# Patient Record
Sex: FEMALE | Race: WHITE | NOT HISPANIC OR LATINO | Employment: OTHER | URBAN - METROPOLITAN AREA
[De-identification: names, ages, dates, MRNs, and addresses within clinical notes are randomized per-mention and may not be internally consistent; named-entity substitution may affect disease eponyms.]

---

## 2019-03-19 ENCOUNTER — OFFICE VISIT (OUTPATIENT)
Dept: GASTROENTEROLOGY | Facility: CLINIC | Age: 79
End: 2019-03-19

## 2019-03-19 VITALS
DIASTOLIC BLOOD PRESSURE: 72 MMHG | BODY MASS INDEX: 28.28 KG/M2 | TEMPERATURE: 97.7 F | HEIGHT: 67 IN | SYSTOLIC BLOOD PRESSURE: 130 MMHG | WEIGHT: 180.2 LBS

## 2019-03-19 DIAGNOSIS — Z12.11 ENCOUNTER FOR SCREENING FOR MALIGNANT NEOPLASM OF COLON: Primary | ICD-10-CM

## 2019-03-19 PROCEDURE — 99213 OFFICE O/P EST LOW 20 MIN: CPT | Performed by: INTERNAL MEDICINE

## 2019-03-19 RX ORDER — SITAGLIPTIN 50 MG/1
50 TABLET, FILM COATED ORAL DAILY
COMMUNITY
Start: 2019-01-24

## 2019-03-19 RX ORDER — PRAVASTATIN SODIUM 10 MG
10 TABLET ORAL NIGHTLY
COMMUNITY
Start: 2019-01-25

## 2019-03-19 NOTE — PROGRESS NOTES
Chief Complaint   Patient presents with   • Heartburn       Tom Hdez is a  79 y.o. female here for a follow up visit for abdominal pain.    HPI     Patient 79-year-old female with history of recurrent diverticulitis last in 2016 with diverticulitis of the jejunum with abscess formation.  Patient underwent small bowel resection now returns with episode in November of recurrent abdominal pain.  Patient found on CT with likely jejunal diverticulitis but was mentioned on the CT there was also colon diverticuli in the same area.  Patient currently with no fever or chills does notice chronic loose stool though.  Patient here for further recommendations.    Past Medical History:   Diagnosis Date   • Arthritis    • COPD (chronic obstructive pulmonary disease) (CMS/HCC)    • Diabetes mellitus (CMS/HCC)    • Diverticulitis of colon    • Fracture     IN LEFT FOOT. HEALED NOW   • History of transfusion     WITH CHILDBIRTH   • Hyperlipidemia    • Hypertension    • WPW (Carol-Parkinson-White syndrome)     CARDIOVERSION. FOLLOWED UP WITH CARDIO ABOUT 10 YRS AGO.         Current Outpatient Medications:   •  albuterol (PROVENTIL HFA;VENTOLIN HFA) 108 (90 BASE) MCG/ACT inhaler, Inhale 2 puffs As Needed for Wheezing., Disp: , Rfl:   •  Ascorbic Acid (VITAMIN C) 500 MG capsule, Take 500 mg by mouth 2 (two) times a day., Disp: , Rfl:   •  ASPIR 81 MG EC tablet, 81 mg. TO STOP 5 DAYS PRIOR TO OR, Disp: , Rfl:   •  calcium citrate-vitamin d (CITRACAL) 200-250 MG-UNIT tablet tablet, Take 1 tablet by mouth 2 (two) times a day., Disp: , Rfl:   •  cholecalciferol (VITAMIN D3) 1000 UNITS tablet, Take 2,000 Units by mouth daily., Disp: , Rfl:   •  fluticasone (FLONASE) 50 MCG/ACT nasal spray, 1 spray into the nostril(s) as directed by provider As Needed., Disp: , Rfl:   •  lisinopril (PRINIVIL,ZESTRIL) 10 MG tablet, Take 10 mg by mouth daily., Disp: , Rfl:   •  Multiple Vitamins-Minerals (MULTIVITAMIN ADULT PO), Take 1 tablet by mouth  daily., Disp: , Rfl:   •  pravastatin (PRAVACHOL) 10 MG tablet, , Disp: , Rfl:   •  saccharomyces boulardii (FLORASTOR) 250 MG capsule, Take 250 mg by mouth 2 (two) times a day., Disp: , Rfl:   •  Biotin 10 MG tablet, Take 10,000 mg by mouth daily., Disp: , Rfl:   •  JANUVIA 50 MG tablet, , Disp: , Rfl:   •  metFORMIN (GLUCOPHAGE) 500 MG tablet, Take 500 mg by mouth 2 (two) times a day with meals., Disp: , Rfl:   •  tiotropium (SPIRIVA) 18 MCG per inhalation capsule, Place 1 capsule into inhaler and inhale daily., Disp: , Rfl:     Allergies   Allergen Reactions   • Asa [Aspirin] GI Intolerance   • Codeine GI Intolerance   • Erythromycin GI Intolerance   • Ibuprofen GI Intolerance   • Penicillins Itching   • Statins Other (See Comments)     LEG PAINS       Social History     Socioeconomic History   • Marital status:      Spouse name: Not on file   • Number of children: Not on file   • Years of education: Not on file   • Highest education level: Not on file   Social Needs   • Financial resource strain: Not on file   • Food insecurity - worry: Not on file   • Food insecurity - inability: Not on file   • Transportation needs - medical: Not on file   • Transportation needs - non-medical: Not on file   Occupational History   • Not on file   Tobacco Use   • Smoking status: Former Smoker     Packs/day: 0.50     Years: 15.00     Pack years: 7.50   • Smokeless tobacco: Never Used   • Tobacco comment: QUIT AT 42 YRS AGO,   Substance and Sexual Activity   • Alcohol use: Yes     Alcohol/week: 1.2 oz     Types: 2 Glasses of wine per week     Comment: social   • Drug use: No   • Sexual activity: Defer   Other Topics Concern   • Not on file   Social History Narrative   • Not on file       Family History   Family history unknown: Yes       Review of Systems   Constitutional: Negative.    Respiratory: Negative.    Cardiovascular: Negative.    Gastrointestinal: Negative.    Musculoskeletal: Negative.    Skin: Negative.     Hematological: Negative.        Vitals:    03/19/19 1349   BP: 130/72   Temp: 97.7 °F (36.5 °C)       Physical Exam   Constitutional: She is oriented to person, place, and time. She appears well-developed and well-nourished.   HENT:   Head: Normocephalic and atraumatic.   Eyes: Pupils are equal, round, and reactive to light. No scleral icterus.   Cardiovascular: Normal rate, regular rhythm and normal heart sounds.   Pulmonary/Chest: Effort normal and breath sounds normal.   Abdominal: Soft. Bowel sounds are normal. She exhibits no distension and no mass. There is no tenderness. No hernia.   Neurological: She is alert and oriented to person, place, and time.   Skin: Skin is warm and dry. No rash noted.   Psychiatric: She has a normal mood and affect. Her behavior is normal.   Vitals reviewed.      No visits with results within 2 Month(s) from this visit.   Latest known visit with results is:   Admission on 03/24/2016, Discharged on 03/29/2016   Component Date Value Ref Range Status   • Glucose 03/24/2016 161* 65 - 99 mg/dL Final   • Case Report 03/24/2016    Final                    Value:Surgical Pathology Report                         Case: [id number]                                  Authorizing Provider:  Gerry Rdz MD        Collected:           03/24/2016 01:03 PM          Ordering Location:     [facility]  Received:            03/24/2016 03:09 PM                                 MAIN OR                                                                      Pathologist:           Bert Pham MD                                                                           Specimen:    Small Intestine, Jejunum, mesenteric abcess                                               • Final Diagnosis 03/24/2016    Final                    Value:This result contains rich text formatting which cannot be displayed here.    • Gross Description 03/24/2016    Final                    Value:This result contains rich text formatting which cannot be displayed here.   • Microscopic Description 03/24/2016    Final                    Value:This result contains rich text formatting which cannot be displayed here.   • Culture 03/24/2016 No anaerobes isolated at 3 days   Final   • Wound Culture 03/24/2016 No growth at 3 days   Final   • Gram Stain 03/24/2016 3+ WBCs seen   Final   • Gram Stain 03/24/2016 No organisms seen   Final   • Glucose, Fasting 03/24/2016 139* 72 - 112 mg/dL Final   • Glucose 03/24/2016 152* 65 - 99 mg/dL Final   • Glucose 03/24/2016 158* 65 - 99 mg/dL Final   • Glucose 03/25/2016 161* 65 - 99 mg/dL Final   • BUN 03/25/2016 11  8 - 23 mg/dL Final   • Creatinine 03/25/2016 0.74  0.57 - 1.00 mg/dL Final   • Sodium 03/25/2016 138  136 - 145 mmol/L Final   • Potassium 03/25/2016 4.2  3.5 - 5.2 mmol/L Final   • Chloride 03/25/2016 100  98 - 107 mmol/L Final   • CO2 03/25/2016 28.1  22.0 - 29.0 mmol/L Final   • Calcium 03/25/2016 7.9* 8.6 - 10.5 mg/dL Final   • eGFR Non African Amer 03/25/2016 76  >60 mL/min/1.73 Final   • BUN/Creatinine Ratio 03/25/2016 14.9  7.0 - 25.0 Final   • Anion Gap 03/25/2016 9.9  mmol/L Final   • WBC 03/25/2016 16.50* 4.50 - 10.70 10*3/mm3 Final   • RBC 03/25/2016 3.46* 3.90 - 5.20 10*6/mm3 Final   • Hemoglobin 03/25/2016 9.9* 11.9 - 15.5 g/dL Final   • Hematocrit 03/25/2016 31.0* 35.6 - 45.5 % Final   • MCV 03/25/2016 89.6  80.5 - 98.2 fL Final   • MCH 03/25/2016 28.6  26.9 - 32.7 pg Final   • MCHC 03/25/2016 31.9* 32.4 - 36.3 g/dL Final   • RDW 03/25/2016 13.8* 11.7 - 13.0 % Final   • RDW-SD 03/25/2016 45.3  37.0 - 54.0 fl Final   • MPV 03/25/2016 10.4  6.0 - 12.0 fL Final   • Platelets 03/25/2016 252  140 - 500 10*3/mm3 Final   • Neutrophil % 03/25/2016 78.0* 42.7 - 76.0 % Final   • Lymphocyte % 03/25/2016 16.3* 19.6 - 45.3 % Final   • Monocyte % 03/25/2016 5.3  5.0 - 12.0 % Final   • Eosinophil %  03/25/2016 0.0* 0.3 - 6.2 % Final   • Basophil % 03/25/2016 0.1  0.0 - 1.5 % Final   • Immature Grans % 03/25/2016 0.3  0.0 - 0.5 % Final   • Neutrophils, Absolute 03/25/2016 12.86* 1.90 - 8.10 10*3/mm3 Final   • Lymphocytes, Absolute 03/25/2016 2.69  0.90 - 4.80 10*3/mm3 Final   • Monocytes, Absolute 03/25/2016 0.88  0.20 - 1.20 10*3/mm3 Final   • Eosinophils, Absolute 03/25/2016 0.00  0.00 - 0.70 10*3/mm3 Final   • Basophils, Absolute 03/25/2016 0.02  0.00 - 0.20 10*3/mm3 Final   • Immature Grans, Absolute 03/25/2016 0.05* 0.00 - 0.03 10*3/mm3 Final   • Glucose 03/25/2016 153* 65 - 99 mg/dL Final   • Glucose 03/25/2016 122* 65 - 99 mg/dL Final   • Glucose 03/25/2016 111* 65 - 99 mg/dL Final   • Glucose 03/26/2016 138* 65 - 99 mg/dL Final   • BUN 03/26/2016 8  8 - 23 mg/dL Final   • Creatinine 03/26/2016 0.64  0.57 - 1.00 mg/dL Final   • Sodium 03/26/2016 135* 136 - 145 mmol/L Final   • Potassium 03/26/2016 4.4  3.5 - 5.2 mmol/L Final   • Chloride 03/26/2016 98  98 - 107 mmol/L Final   • CO2 03/26/2016 24.0  22.0 - 29.0 mmol/L Final   • Calcium 03/26/2016 8.3* 8.6 - 10.5 mg/dL Final   • eGFR Non African Amer 03/26/2016 90  >60 mL/min/1.73 Final   • BUN/Creatinine Ratio 03/26/2016 12.5  7.0 - 25.0 Final   • Anion Gap 03/26/2016 13.0  mmol/L Final   • WBC 03/26/2016 13.67* 4.50 - 10.70 10*3/mm3 Final   • RBC 03/26/2016 3.36* 3.90 - 5.20 10*6/mm3 Final   • Hemoglobin 03/26/2016 9.7* 11.9 - 15.5 g/dL Final   • Hematocrit 03/26/2016 30.6* 35.6 - 45.5 % Final   • MCV 03/26/2016 91.1  80.5 - 98.2 fL Final   • MCH 03/26/2016 28.9  26.9 - 32.7 pg Final   • MCHC 03/26/2016 31.7* 32.4 - 36.3 g/dL Final   • RDW 03/26/2016 14.0* 11.7 - 13.0 % Final   • RDW-SD 03/26/2016 46.4  37.0 - 54.0 fl Final   • MPV 03/26/2016 10.5  6.0 - 12.0 fL Final   • Platelets 03/26/2016 261  140 - 500 10*3/mm3 Final   • Neutrophil % 03/26/2016 73.3  42.7 - 76.0 % Final   • Lymphocyte % 03/26/2016 18.3* 19.6 - 45.3 % Final   • Monocyte % 03/26/2016  6.6  5.0 - 12.0 % Final   • Eosinophil % 03/26/2016 1.0  0.3 - 6.2 % Final   • Basophil % 03/26/2016 0.4  0.0 - 1.5 % Final   • Immature Grans % 03/26/2016 0.4  0.0 - 0.5 % Final   • Neutrophils, Absolute 03/26/2016 10.03* 1.90 - 8.10 10*3/mm3 Final   • Lymphocytes, Absolute 03/26/2016 2.50  0.90 - 4.80 10*3/mm3 Final   • Monocytes, Absolute 03/26/2016 0.90  0.20 - 1.20 10*3/mm3 Final   • Eosinophils, Absolute 03/26/2016 0.14  0.00 - 0.70 10*3/mm3 Final   • Basophils, Absolute 03/26/2016 0.05  0.00 - 0.20 10*3/mm3 Final   • Immature Grans, Absolute 03/26/2016 0.05* 0.00 - 0.03 10*3/mm3 Final   • Glucose 03/26/2016 110* 65 - 99 mg/dL Final   • Glucose 03/26/2016 108* 65 - 99 mg/dL Final   • Glucose 03/26/2016 91  65 - 99 mg/dL Final   • Glucose 03/26/2016 99  65 - 99 mg/dL Final   • Glucose 03/27/2016 96  65 - 99 mg/dL Final   • BUN 03/27/2016 4* 8 - 23 mg/dL Final   • Creatinine 03/27/2016 0.60  0.57 - 1.00 mg/dL Final   • Sodium 03/27/2016 133* 136 - 145 mmol/L Final   • Potassium 03/27/2016 4.1  3.5 - 5.2 mmol/L Final   • Chloride 03/27/2016 98  98 - 107 mmol/L Final   • CO2 03/27/2016 24.4  22.0 - 29.0 mmol/L Final   • Calcium 03/27/2016 8.1* 8.6 - 10.5 mg/dL Final   • eGFR Non African Amer 03/27/2016 97  >60 mL/min/1.73 Final   • BUN/Creatinine Ratio 03/27/2016 6.7* 7.0 - 25.0 Final   • Anion Gap 03/27/2016 10.6  mmol/L Final   • WBC 03/27/2016 7.86  4.50 - 10.70 10*3/mm3 Final   • RBC 03/27/2016 3.13* 3.90 - 5.20 10*6/mm3 Final   • Hemoglobin 03/27/2016 8.9* 11.9 - 15.5 g/dL Final   • Hematocrit 03/27/2016 29.0* 35.6 - 45.5 % Final   • MCV 03/27/2016 92.7  80.5 - 98.2 fL Final   • MCH 03/27/2016 28.4  26.9 - 32.7 pg Final   • MCHC 03/27/2016 30.7* 32.4 - 36.3 g/dL Final   • RDW 03/27/2016 13.7* 11.7 - 13.0 % Final   • RDW-SD 03/27/2016 45.7  37.0 - 54.0 fl Final   • MPV 03/27/2016 10.0  6.0 - 12.0 fL Final   • Platelets 03/27/2016 229  140 - 500 10*3/mm3 Final   • Neutrophil % 03/27/2016 47.5  42.7 - 76.0 %  Final   • Lymphocyte % 03/27/2016 33.5  19.6 - 45.3 % Final   • Monocyte % 03/27/2016 9.4  5.0 - 12.0 % Final   • Eosinophil % 03/27/2016 8.7* 0.3 - 6.2 % Final   • Basophil % 03/27/2016 0.5  0.0 - 1.5 % Final   • Immature Grans % 03/27/2016 0.4  0.0 - 0.5 % Final   • Neutrophils, Absolute 03/27/2016 3.74  1.90 - 8.10 10*3/mm3 Final   • Lymphocytes, Absolute 03/27/2016 2.63  0.90 - 4.80 10*3/mm3 Final   • Monocytes, Absolute 03/27/2016 0.74  0.20 - 1.20 10*3/mm3 Final   • Eosinophils, Absolute 03/27/2016 0.68  0.00 - 0.70 10*3/mm3 Final   • Basophils, Absolute 03/27/2016 0.04  0.00 - 0.20 10*3/mm3 Final   • Immature Grans, Absolute 03/27/2016 0.03  0.00 - 0.03 10*3/mm3 Final   • Glucose 03/27/2016 85  65 - 99 mg/dL Final   • Glucose 03/27/2016 115* 65 - 99 mg/dL Final   • Glucose 03/27/2016 114* 65 - 99 mg/dL Final   • Glucose 03/27/2016 107* 65 - 99 mg/dL Final   • Glucose 03/28/2016 102* 65 - 99 mg/dL Final   • Glucose 03/28/2016 96  65 - 99 mg/dL Final       Dianatres was seen today for heartburn.    Diagnoses and all orders for this visit:    Encounter for screening for malignant neoplasm of colon  -     Case Request; Standing  -     Implement Anesthesia orders day of procedure.; Standing  -     Obtain informed consent; Standing  -     Case Request      Patient 79-year-old female with history of diverticulitis of the small bowel in the past requiring resection secondary to an abscess formation.  Patient does have a history of colonic diverticulosis as well, had another episode of acute pain in the left upper quadrant.  Patient found on CT with uncomplicated diverticulitis what was felt to be of the jejunum again but in the same area with transverse colon diverticuli.  No abscess or free air was seen patient was treated empirically.  Patient reports feeling improved at this time no further pain.  Patient currently on oral probiotics here for further recommendations at this point especially due to the fact that the  diverticulosis of the colon was in the same area would recommend repeat colonoscopy, last done in 2010.  If no other findings with maintain current diet but monitor for symptoms and treat early case recurrent small bowel diverticulitis occurs.  We will try as best as possible to avoid further small bowel resection.

## 2019-04-03 ENCOUNTER — ANESTHESIA (OUTPATIENT)
Dept: GASTROENTEROLOGY | Facility: HOSPITAL | Age: 79
End: 2019-04-03

## 2019-04-03 ENCOUNTER — HOSPITAL ENCOUNTER (OUTPATIENT)
Facility: HOSPITAL | Age: 79
Setting detail: HOSPITAL OUTPATIENT SURGERY
Discharge: HOME | End: 2019-04-03
Attending: INTERNAL MEDICINE | Admitting: INTERNAL MEDICINE

## 2019-04-03 ENCOUNTER — ANESTHESIA EVENT (OUTPATIENT)
Dept: GASTROENTEROLOGY | Facility: HOSPITAL | Age: 79
End: 2019-04-03

## 2019-04-03 VITALS
DIASTOLIC BLOOD PRESSURE: 70 MMHG | HEIGHT: 67 IN | BODY MASS INDEX: 28.09 KG/M2 | TEMPERATURE: 98.1 F | HEART RATE: 66 BPM | WEIGHT: 179 LBS | SYSTOLIC BLOOD PRESSURE: 138 MMHG | OXYGEN SATURATION: 98 % | RESPIRATION RATE: 18 BRPM

## 2019-04-03 DIAGNOSIS — Z12.11 ENCOUNTER FOR SCREENING FOR MALIGNANT NEOPLASM OF COLON: ICD-10-CM

## 2019-04-03 LAB — GLUCOSE BLDC GLUCOMTR-MCNC: 153 MG/DL (ref 70–130)

## 2019-04-03 PROCEDURE — 82962 GLUCOSE BLOOD TEST: CPT

## 2019-04-03 PROCEDURE — 25010000002 PROPOFOL 10 MG/ML EMULSION: Performed by: ANESTHESIOLOGY

## 2019-04-03 PROCEDURE — 88305 TISSUE EXAM BY PATHOLOGIST: CPT | Performed by: INTERNAL MEDICINE

## 2019-04-03 PROCEDURE — 45385 COLONOSCOPY W/LESION REMOVAL: CPT | Performed by: INTERNAL MEDICINE

## 2019-04-03 RX ORDER — SODIUM CHLORIDE 0.9 % (FLUSH) 0.9 %
3 SYRINGE (ML) INJECTION AS NEEDED
Status: DISCONTINUED | OUTPATIENT
Start: 2019-04-03 | End: 2019-04-03 | Stop reason: HOSPADM

## 2019-04-03 RX ORDER — PROPOFOL 10 MG/ML
VIAL (ML) INTRAVENOUS AS NEEDED
Status: DISCONTINUED | OUTPATIENT
Start: 2019-04-03 | End: 2019-04-03 | Stop reason: SURG

## 2019-04-03 RX ORDER — LIDOCAINE HYDROCHLORIDE 20 MG/ML
INJECTION, SOLUTION INFILTRATION; PERINEURAL AS NEEDED
Status: DISCONTINUED | OUTPATIENT
Start: 2019-04-03 | End: 2019-04-03 | Stop reason: SURG

## 2019-04-03 RX ORDER — SODIUM CHLORIDE, SODIUM LACTATE, POTASSIUM CHLORIDE, CALCIUM CHLORIDE 600; 310; 30; 20 MG/100ML; MG/100ML; MG/100ML; MG/100ML
1000 INJECTION, SOLUTION INTRAVENOUS CONTINUOUS
Status: DISCONTINUED | OUTPATIENT
Start: 2019-04-03 | End: 2019-04-03 | Stop reason: HOSPADM

## 2019-04-03 RX ORDER — LIDOCAINE HYDROCHLORIDE 10 MG/ML
0.5 INJECTION, SOLUTION INFILTRATION; PERINEURAL ONCE AS NEEDED
Status: DISCONTINUED | OUTPATIENT
Start: 2019-04-03 | End: 2019-04-03 | Stop reason: HOSPADM

## 2019-04-03 RX ORDER — PROPOFOL 10 MG/ML
VIAL (ML) INTRAVENOUS CONTINUOUS PRN
Status: DISCONTINUED | OUTPATIENT
Start: 2019-04-03 | End: 2019-04-03 | Stop reason: SURG

## 2019-04-03 RX ADMIN — PROPOFOL 100 MCG/KG/MIN: 10 INJECTION, EMULSION INTRAVENOUS at 10:20

## 2019-04-03 RX ADMIN — LIDOCAINE HYDROCHLORIDE 60 MG: 20 INJECTION, SOLUTION INFILTRATION; PERINEURAL at 10:20

## 2019-04-03 RX ADMIN — SODIUM CHLORIDE, POTASSIUM CHLORIDE, SODIUM LACTATE AND CALCIUM CHLORIDE 1000 ML: 600; 310; 30; 20 INJECTION, SOLUTION INTRAVENOUS at 09:51

## 2019-04-03 RX ADMIN — PROPOFOL 100 MG: 10 INJECTION, EMULSION INTRAVENOUS at 10:20

## 2019-04-03 NOTE — ANESTHESIA POSTPROCEDURE EVALUATION
"Patient: Tom Hdez    Procedure Summary     Date:  04/03/19 [facility]    Anesthesia Start:  1018 Anesthesia Stop:  1050    Procedure:  COLONOSCOPY to cecum and t.i. with hot snare polypectomy (N/A ) Diagnosis:       Encounter for screening for malignant neoplasm of colon      Colon polyps      Diverticulosis      Internal hemorrhoids without complication      (Encounter for screening for malignant neoplasm of colon [Z12.11])    Surgeon:  Thien Cole MD Provider:  Damian Estrada MD    Anesthesia Type:  MAC ASA Status:  3          Anesthesia Type: MAC  Last vitals  BP   138/70 (04/03/19 1108)   Temp   36.7 °C (98.1 °F) (04/03/19 1058)   Pulse   66 (04/03/19 1108)   Resp   18 (04/03/19 1108)     SpO2   98 % (04/03/19 1108)     Post Anesthesia Care and Evaluation    Patient location during evaluation: PACU  Patient participation: complete - patient participated  Level of consciousness: awake  Pain score: 0  Pain management: adequate  Airway patency: patent  Anesthetic complications: No anesthetic complications  PONV Status: none  Cardiovascular status: acceptable  Respiratory status: acceptable  Hydration status: acceptable    Comments: /70 (BP Location: Left arm, Patient Position: Lying)   Pulse 66   Temp 36.7 °C (98.1 °F) (Oral)   Resp 18   Ht 170.2 cm (67\")   Wt 81.2 kg (179 lb)   SpO2 98%   BMI 28.04 kg/m²       "

## 2019-04-03 NOTE — ANESTHESIA PREPROCEDURE EVALUATION
Anesthesia Evaluation     Patient summary reviewed and Nursing notes reviewed                Airway   Mallampati: I  TM distance: >3 FB  Neck ROM: full  No difficulty expected  Dental - normal exam     Pulmonary - negative pulmonary ROS and normal exam   Cardiovascular - normal exam    (+) hypertension, hyperlipidemia,       Neuro/Psych- negative ROS  GI/Hepatic/Renal/Endo    (+)   diabetes mellitus,     Musculoskeletal     Abdominal  - normal exam    Bowel sounds: normal.   Substance History - negative use     OB/GYN negative ob/gyn ROS         Other   (+) arthritis                     Anesthesia Plan    ASA 3     MAC     Anesthetic plan, all risks, benefits, and alternatives have been provided, discussed and informed consent has been obtained with: patient.

## 2019-04-03 NOTE — BRIEF OP NOTE
COLONOSCOPY  Progress Note    Tom Hdez  4/3/2019    Pre-op Diagnosis:   Encounter for screening for malignant neoplasm of colon [Z12.11]       Post-Op Diagnosis Codes:     * Encounter for screening for malignant neoplasm of colon [Z12.11]     * Colon polyps [K63.5]     * Diverticulosis [K57.90]     * Internal hemorrhoids without complication [K64.8]    Procedure/CPT® Codes:      Procedure(s):  COLONOSCOPY to cecum and t.i. with hot snare polypectomy    Surgeon(s):  Thien Cole MD    Anesthesia: Monitor Anesthesia Care    Staff:   Endo Technician: Marichuy Anne  Endo Nurse: Sabine Aguilar RN; Gabby Damon RN    Estimated Blood Loss: none    Urine Voided: * No values recorded between 4/3/2019 10:18 AM and 4/3/2019 10:42 AM *    Specimens:                Specimens     ID Source Type Tests Collected By Collected At Frozen?      A Large Intestine, Transverse Colon Polyp · TISSUE PATHOLOGY EXAM   Thien Cole MD 4/3/19 1037      Description: tranverse colon polyp                Drains:      Findings: Colonoscopy to terminal ileum with normal mucosa.  Transverse colon polyp removed completely snare cautery diverticulosis and internal hemorrhoids were noted as well.    Complications: None      Thien Cole MD     Date: 4/3/2019  Time: 10:45 AM

## 2019-04-04 LAB
CYTO UR: NORMAL
LAB AP CASE REPORT: NORMAL
LAB AP DIAGNOSIS COMMENT: NORMAL
PATH REPORT.FINAL DX SPEC: NORMAL
PATH REPORT.GROSS SPEC: NORMAL

## 2019-04-19 ENCOUNTER — TELEPHONE (OUTPATIENT)
Dept: GASTROENTEROLOGY | Facility: CLINIC | Age: 79
End: 2019-04-19

## 2019-04-19 NOTE — TELEPHONE ENCOUNTER
----- Message from Thien Cole MD sent at 4/19/2019  1:12 PM EDT -----  Benign inflammatory polyp.  Repeat colonoscopy 10 years

## 2019-04-19 NOTE — TELEPHONE ENCOUNTER
Call to pt.  Advise per Dr Cole that benign inflammatory polyp.  Repeat c/s in 10 yrs.  Verb understanding.    C/s for 4/3/29 placed in recall.

## 2021-06-22 ENCOUNTER — OFFICE VISIT (OUTPATIENT)
Dept: ORTHOPEDIC SURGERY | Facility: CLINIC | Age: 81
End: 2021-06-22

## 2021-06-22 VITALS — WEIGHT: 179 LBS | TEMPERATURE: 96.9 F | BODY MASS INDEX: 28.09 KG/M2 | HEIGHT: 67 IN

## 2021-06-22 DIAGNOSIS — R52 PAIN: Primary | ICD-10-CM

## 2021-06-22 DIAGNOSIS — M51.36 LUMBAR DEGENERATIVE DISC DISEASE: ICD-10-CM

## 2021-06-22 DIAGNOSIS — M46.1 SACROILIITIS (HCC): ICD-10-CM

## 2021-06-22 PROCEDURE — 99203 OFFICE O/P NEW LOW 30 MIN: CPT | Performed by: ORTHOPAEDIC SURGERY

## 2021-06-22 PROCEDURE — 73501 X-RAY EXAM HIP UNI 1 VIEW: CPT | Performed by: ORTHOPAEDIC SURGERY

## 2021-06-22 RX ORDER — MELOXICAM 15 MG/1
15 TABLET ORAL DAILY
Qty: 30 TABLET | Refills: 0 | Status: SHIPPED | OUTPATIENT
Start: 2021-06-22

## 2021-06-22 RX ORDER — LISINOPRIL 20 MG/1
TABLET ORAL
COMMUNITY
Start: 2021-05-17

## 2021-06-22 NOTE — PROGRESS NOTES
Patient: Tom Hdez  YOB: 1940 81 y.o. female  Medical Record Number: [id number]    Chief Complaints:   Chief Complaint   Patient presents with   • Right Hip - Establish Care, Pain       History of Present Illness:Tom Hdez is a 81 y.o. female who presents with complaints of right hip pain that originated 3 months ago.  Patient denies any known injury to her hip.  Patient states the pain is located just below her right butt cheek.  She describes the pain as a ache and rates it as a 3 out of 10 when taking Tylenol.  She states that she has this pain daily and is exacerbated with activities.  Patient is here today for further evaluation.    Allergies:   Allergies   Allergen Reactions   • Asa [Aspirin] GI Intolerance   • Codeine GI Intolerance   • Erythromycin GI Intolerance   • Ibuprofen GI Intolerance   • Penicillins Itching   • Statins Other (See Comments)     LEG PAINS. Pt reports intolerance. Not allergy       Medications:   Current Outpatient Medications   Medication Sig Dispense Refill   • Ascorbic Acid (VITAMIN C) 500 MG capsule Take 500 mg by mouth 2 (two) times a day.     • ASPIR 81 MG EC tablet Take 81 mg by mouth Daily. PT HOLDING FOR SURGERY     • calcium citrate-vitamin d (CITRACAL) 200-250 MG-UNIT tablet tablet Take 1 tablet by mouth Daily.     • cholecalciferol (VITAMIN D3) 1000 UNITS tablet Take 2,000 Units by mouth daily.     • JANUVIA 50 MG tablet Take 50 mg by mouth Daily.     • lisinopril (PRINIVIL,ZESTRIL) 10 MG tablet Take 10 mg by mouth daily.     • lisinopril (PRINIVIL,ZESTRIL) 20 MG tablet      • Multiple Vitamins-Minerals (MULTIVITAMIN ADULT PO) Take 1 tablet by mouth daily.     • pravastatin (PRAVACHOL) 10 MG tablet Take 10 mg by mouth Every Night.     • sertraline (ZOLOFT) 50 MG tablet      • albuterol (PROVENTIL HFA;VENTOLIN HFA) 108 (90 BASE) MCG/ACT inhaler Inhale 2 puffs As Needed for Wheezing.     • fluticasone (FLONASE) 50 MCG/ACT nasal spray 1 spray into  "the nostril(s) as directed by provider As Needed.       No current facility-administered medications for this visit.         The following portions of the patient's history were reviewed and updated as appropriate: allergies, current medications, past family history, past medical history, past social history, past surgical history and problem list.    Review of Systems:   A 14 point review of systems was performed. All systems negative except pertinent positives/negative listed in HPI above    Physical Exam:   Vitals:    06/22/21 1408   Temp: 96.9 °F (36.1 °C)   Weight: 81.2 kg (179 lb)   Height: 170.2 cm (67\")       General: A and O x 3, ASA, NAD    SCLERA:    Normal    DENTITION:   Normal     Hip:  right    LEG ALIGNMENT:     Neutral   ,    equal leg lengths    GAIT:     Nonantalgic    SKIN:     No abnormality    RANGE OF MOTION:      Full without joint irritability    STRENGTH:     5 / 5    hip flexion and abduction    DISTAL PULSES:    Paplable    DISTAL SENSATION :   Intact    LYMPHATICS:     No   lymphadenopathy    OTHER:          - Negative Stinchfeld test      - Negative log roll      - No Tenderness to palpation trochanteric bursa      - Neg FADIR      - Neg DESTIN      - No SI tenderness       Radiology:  Xrays 2views (AP bilateral hips, and lateral of the hip) ordered and reviewed for evaluation of hip pain  demonstrating  minimal arthritic findings.  No previous x-rays were available for comparison.    Assessment/Plan: Lumbar DDD/right sacroiliitis    Treatment option as well as imaging results were discussed in detail with the patient.  Based off patient's symptoms and examination she has right sacroiliitis and does have some lumbar DDD on her x-rays.  We will give her a prescription for meloxicam as well as physical therapy.  Should she not have any improvements in the month we will order an MRI of her lumbar spine for further evaluation.        Brayden Vera, APRN  6/22/2021     This patient was seen in " conjunction today with Dr. Juancarlos Buckley.  Dr. Buckley agrees with the above-stated assessment and plan.

## 2024-11-27 ENCOUNTER — ANESTHESIA EVENT (OUTPATIENT)
Age: 84
End: 2024-11-27
Payer: MEDICARE

## 2024-11-27 NOTE — PAT
[facility] AA review. Dr. Torres  reviewed chart, okay to proceed at ASC for scheduled surgery.

## 2024-12-02 RX ORDER — EZETIMIBE 10 MG/1
10 TABLET ORAL DAILY
COMMUNITY
Start: 2024-11-22

## 2024-12-02 RX ORDER — ACETAMINOPHEN 500 MG
500 TABLET ORAL
COMMUNITY

## 2024-12-02 NOTE — PAT
PAT call complete. Education provided to the patient on the following:    - Nothing to eat after midnight the night before your procedure, water and black coffee okay up to 2 hours before arrival time. 0400  - If diabetic and procedure is after noon: No food 8 hours prior to arrival time, and only then only clear liquids 2 hours before arrival time.   - You will need to have someone drive you home after your procedure and remain with you for 24 hours after. The  will need to remain on site during your visit.  - Please remove all jewelry, including body piercing's, and leave any valuables at home. Only bring your drivers license and insurance card on day of procedure.  - Do not wear contact lenses; wear glasses and bring your case.  - Wash with antibacterial soap (such as Dial) the night before and morning of procedure.  - Be prepared to provide your last dose of all home medications.  - Coffee and vending available on the 1st and 5th floors; no cafeteria on site.  - You will need to arrive at 0600 on 12/3 at [facility] located at [address]. We are located on the 5th floor.  -Please be aware that arrival times may be subject to change up until the day of surgery.   - Feel free to contact us at: [phone] with any additional questions/concerns.       Patient unsure about some medications. States she is holding aspirin and vitamins but unsure if she takes mobic since her  handles her medications. Patient to hold januvia and lisinopril DOS.

## 2024-12-02 NOTE — DISCHARGE INSTRUCTIONS
POST OPERATIVE INSTRUCTIONS  EYELID SURGERY        Most procedures are performed with local anesthesia with intravenous sedation. While post-operative nausea is rare with this type of anesthesia, it is wise to start your diet by drinking clear liquids after surgery (e.g., 7-Up, Gatorade, ginger ale, etc.).  If clear liquids are tolerated well without nausea or vomiting, you may advance towards a regular diet.  Avoid “fatty foods” (eg, milk, pizza, hamburgers, etc.) on the day of surgery or as long as nausea persists.      Many eyelid procedures only require Extra Strength Tylenol for postoperative pain control.  For those patients with more extensive eyelid reconstruction, a prescription for a pain reliever is often given.  Patients may choose to take the prescribed pain reliever OR Extra Strength Tylenol, BUT NOT both together.  Unless specifically instructed, aspirin products such as Jigna, Bufferin, Anacin, Excedrin, etc., should be avoided for at least one week after surgery.  This also includes Advil, Nuprin, Motrin and Ibuprofen.  Also wait one week to restart vitamins, fish oils, or herbal medications. Any other medications (except blood thinners), which you were taking prior to surgery, should be continued on their regular schedule. If you are on blood thinners, we will call you the day after your surgery to discuss when to restart.     Whenever lying down or sleeping, keep your head elevated on 2 or 3 pillows such that your head is always elevated above the level of your chest.      Apply cold compress to surgical site for 15 min every 1 hour while awake for first 3 days following surgery. Can then decrease frequency to 4-5x daily until followup. A folded washcloth soaked in ice-cold water and wrung out works well for this.  A bag of frozen peas/corn also works well as it is lightweight but molds to the eye.  Do not apply ice directly to the skin. Can also alternate between cold compresses and warm  compresses after the first 3 days.    A small tube of eye ointment should be provided after surgery.  This is to be gently applied to the stitches and/or eye three times daily.  If the eyes feel irritated, the ointment is safe to use in the eye for lubrication.  This will likely result in blurred vision but will go away once the ointment is stopped.    BLUE STITCHES ON FOREHEAD:   No ice; only ointment 3 times/day. Stitches will be removed at your post op appt next week.     If your surgery was done on an outpatient basis, you should receive a phone call the day after surgery to check on your progress and to arrange for a post-operative clinic appointment.  The first post-operative visit will be 1-2 weeks after surgery to check the incisions and remove sutures if necessary.  This appointment may be with Dr. Peña, who is Dr. Mcdermott/Efren's surgical fellow. A second post-operative visit six to eight weeks after surgery will assess the final surgical result.    The following problems should be reported to the office as soon as possible:  Continuous, brisk bleeding.  Please note that some oozing or drainage is common following a surgical procedure.  Do not try to clean dried blood from the surgical site.   This will likely only create more bleeding.  Temperature (fever) over 101?F.  Excessive pain at surgical site not relieved by the pain medication, especially if associated with protrusion of the eyeball.  Sudden loss of vision.  Please note that some blurriness is expected after surgery due to the ointment used around the eyes.  All patients should be able to check their vision even with eyelid swelling.      If a problem should arise or you have a question, someone from our team can be reached at any time of the day or week by calling [phone].  I hope that your surgery experience with us is a positive one.  Please contact my office with any questions.  Sincerely,       MD Randolph Wilkins,  MD

## 2024-12-03 ENCOUNTER — ANESTHESIA (OUTPATIENT)
Age: 84
End: 2024-12-03
Payer: MEDICARE

## 2024-12-03 ENCOUNTER — HOSPITAL ENCOUNTER (OUTPATIENT)
Age: 84
Setting detail: HOSPITAL OUTPATIENT SURGERY
Discharge: HOME | End: 2024-12-03
Attending: OPHTHALMOLOGY | Admitting: OPHTHALMOLOGY
Payer: MEDICARE

## 2024-12-03 VITALS
DIASTOLIC BLOOD PRESSURE: 67 MMHG | HEIGHT: 67 IN | TEMPERATURE: 97.6 F | WEIGHT: 165.6 LBS | SYSTOLIC BLOOD PRESSURE: 139 MMHG | HEART RATE: 83 BPM | RESPIRATION RATE: 14 BRPM | OXYGEN SATURATION: 100 % | BODY MASS INDEX: 25.99 KG/M2

## 2024-12-03 DIAGNOSIS — L90.5 SCAR OF FOREHEAD: ICD-10-CM

## 2024-12-03 LAB
GLUCOSE BLDC GLUCOMTR-MCNC: 142 MG/DL (ref 70–130)
GLUCOSE BLDC GLUCOMTR-MCNC: 155 MG/DL (ref 70–130)

## 2024-12-03 PROCEDURE — 88305 TISSUE EXAM BY PATHOLOGIST: CPT | Performed by: OPHTHALMOLOGY

## 2024-12-03 PROCEDURE — 25010000002 ONDANSETRON PER 1 MG: Performed by: ANESTHESIOLOGY

## 2024-12-03 PROCEDURE — 25010000002 FENTANYL CITRATE (PF) 50 MCG/ML SOLUTION: Performed by: ANESTHESIOLOGY

## 2024-12-03 PROCEDURE — 25010000002 PROPOFOL 10 MG/ML EMULSION: Performed by: ANESTHESIOLOGY

## 2024-12-03 PROCEDURE — 25010000002 CEFAZOLIN PER 500 MG: Performed by: OPHTHALMOLOGY

## 2024-12-03 PROCEDURE — 67900 REPAIR BROW DEFECT: CPT | Performed by: OPHTHALMOLOGY

## 2024-12-03 PROCEDURE — 14040 TIS TRNFR F/C/C/M/N/A/G/H/F: CPT | Performed by: OPHTHALMOLOGY

## 2024-12-03 PROCEDURE — 25010000002 BUPIVACAINE (PF) 0.5 % SOLUTION 10 ML VIAL: Performed by: OPHTHALMOLOGY

## 2024-12-03 PROCEDURE — 25010000002 LIDOCAINE 2% SOLUTION: Performed by: ANESTHESIOLOGY

## 2024-12-03 PROCEDURE — 25010000002 LIDOCAINE-EPINEPHRINE (PF) 2 %-1:200000 SOLUTION 20 ML VIAL: Performed by: OPHTHALMOLOGY

## 2024-12-03 PROCEDURE — 15823 BLEPHARP UPR EYELID XCSV SKN: CPT | Performed by: OPHTHALMOLOGY

## 2024-12-03 PROCEDURE — 25010000002 PHENYLEPHRINE 10 MG/ML SOLUTION: Performed by: ANESTHESIOLOGY

## 2024-12-03 PROCEDURE — 25010000002 DEXAMETHASONE PER 1 MG: Performed by: ANESTHESIOLOGY

## 2024-12-03 PROCEDURE — 25010000002 KETOROLAC TROMETHAMINE PER 15 MG: Performed by: ANESTHESIOLOGY

## 2024-12-03 PROCEDURE — 25810000003 LACTATED RINGERS PER 1000 ML: Performed by: ANESTHESIOLOGY

## 2024-12-03 RX ORDER — SODIUM CHLORIDE 0.9 % (FLUSH) 0.9 %
3 SYRINGE (ML) INJECTION EVERY 12 HOURS SCHEDULED
Status: DISCONTINUED | OUTPATIENT
Start: 2024-12-03 | End: 2024-12-03 | Stop reason: HOSPADM

## 2024-12-03 RX ORDER — PHENYLEPHRINE HYDROCHLORIDE 10 MG/ML
INJECTION INTRAVENOUS AS NEEDED
Status: DISCONTINUED | OUTPATIENT
Start: 2024-12-03 | End: 2024-12-03 | Stop reason: SURG

## 2024-12-03 RX ORDER — ONDANSETRON 2 MG/ML
4 INJECTION INTRAMUSCULAR; INTRAVENOUS ONCE AS NEEDED
Status: DISCONTINUED | OUTPATIENT
Start: 2024-12-03 | End: 2024-12-03 | Stop reason: HOSPADM

## 2024-12-03 RX ORDER — SODIUM CHLORIDE, SODIUM LACTATE, POTASSIUM CHLORIDE, CALCIUM CHLORIDE 600; 310; 30; 20 MG/100ML; MG/100ML; MG/100ML; MG/100ML
9 INJECTION, SOLUTION INTRAVENOUS CONTINUOUS
Status: DISCONTINUED | OUTPATIENT
Start: 2024-12-03 | End: 2024-12-03 | Stop reason: HOSPADM

## 2024-12-03 RX ORDER — HYDROCODONE BITARTRATE AND ACETAMINOPHEN 7.5; 325 MG/1; MG/1
1 TABLET ORAL EVERY 4 HOURS PRN
Status: DISCONTINUED | OUTPATIENT
Start: 2024-12-03 | End: 2024-12-03 | Stop reason: HOSPADM

## 2024-12-03 RX ORDER — KETOROLAC TROMETHAMINE 30 MG/ML
INJECTION, SOLUTION INTRAMUSCULAR; INTRAVENOUS AS NEEDED
Status: DISCONTINUED | OUTPATIENT
Start: 2024-12-03 | End: 2024-12-03 | Stop reason: SURG

## 2024-12-03 RX ORDER — HYDRALAZINE HYDROCHLORIDE 20 MG/ML
5 INJECTION INTRAMUSCULAR; INTRAVENOUS
Status: DISCONTINUED | OUTPATIENT
Start: 2024-12-03 | End: 2024-12-03 | Stop reason: HOSPADM

## 2024-12-03 RX ORDER — HYDROCODONE BITARTRATE AND ACETAMINOPHEN 5; 325 MG/1; MG/1
1 TABLET ORAL EVERY 6 HOURS PRN
Qty: 15 TABLET | Refills: 0 | Status: SHIPPED | OUTPATIENT
Start: 2024-12-03 | End: 2024-12-06

## 2024-12-03 RX ORDER — HYDROCODONE BITARTRATE AND ACETAMINOPHEN 5; 325 MG/1; MG/1
1 TABLET ORAL ONCE AS NEEDED
Status: DISCONTINUED | OUTPATIENT
Start: 2024-12-03 | End: 2024-12-03 | Stop reason: HOSPADM

## 2024-12-03 RX ORDER — FLUMAZENIL 0.1 MG/ML
0.2 INJECTION INTRAVENOUS AS NEEDED
Status: DISCONTINUED | OUTPATIENT
Start: 2024-12-03 | End: 2024-12-03 | Stop reason: HOSPADM

## 2024-12-03 RX ORDER — DIPHENHYDRAMINE HYDROCHLORIDE 50 MG/ML
12.5 INJECTION INTRAMUSCULAR; INTRAVENOUS
Status: DISCONTINUED | OUTPATIENT
Start: 2024-12-03 | End: 2024-12-03 | Stop reason: HOSPADM

## 2024-12-03 RX ORDER — ONDANSETRON 2 MG/ML
INJECTION INTRAMUSCULAR; INTRAVENOUS AS NEEDED
Status: DISCONTINUED | OUTPATIENT
Start: 2024-12-03 | End: 2024-12-03 | Stop reason: SURG

## 2024-12-03 RX ORDER — HYDROMORPHONE HYDROCHLORIDE 1 MG/ML
0.25 INJECTION, SOLUTION INTRAMUSCULAR; INTRAVENOUS; SUBCUTANEOUS
Status: DISCONTINUED | OUTPATIENT
Start: 2024-12-03 | End: 2024-12-03 | Stop reason: HOSPADM

## 2024-12-03 RX ORDER — BACITRACIN 500 [USP'U]/G
OINTMENT OPHTHALMIC 3 TIMES DAILY
Status: SHIPPED | OUTPATIENT
Start: 2024-12-03

## 2024-12-03 RX ORDER — MIDAZOLAM HYDROCHLORIDE 1 MG/ML
0.5 INJECTION, SOLUTION INTRAMUSCULAR; INTRAVENOUS
Status: DISCONTINUED | OUTPATIENT
Start: 2024-12-03 | End: 2024-12-03 | Stop reason: HOSPADM

## 2024-12-03 RX ORDER — DROPERIDOL 2.5 MG/ML
0.62 INJECTION, SOLUTION INTRAMUSCULAR; INTRAVENOUS
Status: DISCONTINUED | OUTPATIENT
Start: 2024-12-03 | End: 2024-12-03 | Stop reason: HOSPADM

## 2024-12-03 RX ORDER — FAMOTIDINE 10 MG/ML
20 INJECTION, SOLUTION INTRAVENOUS ONCE
Status: COMPLETED | OUTPATIENT
Start: 2024-12-03 | End: 2024-12-03

## 2024-12-03 RX ORDER — PROMETHAZINE HYDROCHLORIDE 12.5 MG/1
25 TABLET ORAL ONCE AS NEEDED
Status: DISCONTINUED | OUTPATIENT
Start: 2024-12-03 | End: 2024-12-03 | Stop reason: HOSPADM

## 2024-12-03 RX ORDER — FENTANYL CITRATE 50 UG/ML
25 INJECTION, SOLUTION INTRAMUSCULAR; INTRAVENOUS
Status: DISCONTINUED | OUTPATIENT
Start: 2024-12-03 | End: 2024-12-03 | Stop reason: HOSPADM

## 2024-12-03 RX ORDER — SODIUM CHLORIDE 0.9 % (FLUSH) 0.9 %
3-10 SYRINGE (ML) INJECTION AS NEEDED
Status: DISCONTINUED | OUTPATIENT
Start: 2024-12-03 | End: 2024-12-03 | Stop reason: HOSPADM

## 2024-12-03 RX ORDER — LABETALOL HYDROCHLORIDE 5 MG/ML
5 INJECTION, SOLUTION INTRAVENOUS
Status: DISCONTINUED | OUTPATIENT
Start: 2024-12-03 | End: 2024-12-03 | Stop reason: HOSPADM

## 2024-12-03 RX ORDER — EPHEDRINE SULFATE 50 MG/ML
INJECTION, SOLUTION INTRAVENOUS AS NEEDED
Status: DISCONTINUED | OUTPATIENT
Start: 2024-12-03 | End: 2024-12-03 | Stop reason: SURG

## 2024-12-03 RX ORDER — LIDOCAINE HYDROCHLORIDE 20 MG/ML
INJECTION, SOLUTION INFILTRATION; PERINEURAL AS NEEDED
Status: DISCONTINUED | OUTPATIENT
Start: 2024-12-03 | End: 2024-12-03 | Stop reason: SURG

## 2024-12-03 RX ORDER — PROMETHAZINE HYDROCHLORIDE 25 MG/1
25 SUPPOSITORY RECTAL ONCE AS NEEDED
Status: DISCONTINUED | OUTPATIENT
Start: 2024-12-03 | End: 2024-12-03 | Stop reason: HOSPADM

## 2024-12-03 RX ORDER — DEXAMETHASONE SODIUM PHOSPHATE 4 MG/ML
INJECTION, SOLUTION INTRA-ARTICULAR; INTRALESIONAL; INTRAMUSCULAR; INTRAVENOUS; SOFT TISSUE AS NEEDED
Status: DISCONTINUED | OUTPATIENT
Start: 2024-12-03 | End: 2024-12-03 | Stop reason: SURG

## 2024-12-03 RX ORDER — FENTANYL CITRATE 50 UG/ML
50 INJECTION, SOLUTION INTRAMUSCULAR; INTRAVENOUS ONCE AS NEEDED
Status: COMPLETED | OUTPATIENT
Start: 2024-12-03 | End: 2024-12-03

## 2024-12-03 RX ORDER — NALOXONE HCL 0.4 MG/ML
0.2 VIAL (ML) INJECTION AS NEEDED
Status: DISCONTINUED | OUTPATIENT
Start: 2024-12-03 | End: 2024-12-03 | Stop reason: HOSPADM

## 2024-12-03 RX ORDER — PROPOFOL 10 MG/ML
VIAL (ML) INTRAVENOUS AS NEEDED
Status: DISCONTINUED | OUTPATIENT
Start: 2024-12-03 | End: 2024-12-03 | Stop reason: SURG

## 2024-12-03 RX ADMIN — PROPOFOL 150 MG: 10 INJECTION, EMULSION INTRAVENOUS at 07:09

## 2024-12-03 RX ADMIN — EPHEDRINE SULFATE 10 MG: 50 INJECTION INTRAVENOUS at 07:50

## 2024-12-03 RX ADMIN — SODIUM CHLORIDE, POTASSIUM CHLORIDE, SODIUM LACTATE AND CALCIUM CHLORIDE 9 ML/HR: 600; 310; 30; 20 INJECTION, SOLUTION INTRAVENOUS at 06:37

## 2024-12-03 RX ADMIN — FENTANYL CITRATE 50 MCG: 50 INJECTION, SOLUTION INTRAMUSCULAR; INTRAVENOUS at 08:06

## 2024-12-03 RX ADMIN — SODIUM CHLORIDE 2000 MG: 900 INJECTION INTRAVENOUS at 06:43

## 2024-12-03 RX ADMIN — PHENYLEPHRINE HYDROCHLORIDE 100 MCG: 10 INJECTION INTRAVENOUS at 07:49

## 2024-12-03 RX ADMIN — PROPOFOL 50 MG: 10 INJECTION, EMULSION INTRAVENOUS at 07:17

## 2024-12-03 RX ADMIN — DEXAMETHASONE SODIUM PHOSPHATE 8 MG: 4 INJECTION, SOLUTION INTRA-ARTICULAR; INTRALESIONAL; INTRAMUSCULAR; INTRAVENOUS; SOFT TISSUE at 08:18

## 2024-12-03 RX ADMIN — EPHEDRINE SULFATE 10 MG: 50 INJECTION INTRAVENOUS at 07:46

## 2024-12-03 RX ADMIN — ONDANSETRON 4 MG: 2 INJECTION INTRAMUSCULAR; INTRAVENOUS at 08:18

## 2024-12-03 RX ADMIN — FAMOTIDINE 20 MG: 10 INJECTION, SOLUTION INTRAVENOUS at 06:41

## 2024-12-03 RX ADMIN — EPHEDRINE SULFATE 10 MG: 50 INJECTION INTRAVENOUS at 07:41

## 2024-12-03 RX ADMIN — LIDOCAINE HYDROCHLORIDE 60 MG: 20 INJECTION, SOLUTION INFILTRATION; PERINEURAL at 07:09

## 2024-12-03 RX ADMIN — KETOROLAC TROMETHAMINE 30 MG: 30 INJECTION, SOLUTION INTRAMUSCULAR at 08:18

## 2024-12-03 NOTE — ANESTHESIA PROCEDURE NOTES
Airway  Date/Time: 12/3/2024 7:12 AM  Airway not difficult    General Information and Staff    Patient location during procedure: OR  Anesthesiologist: Reinier Brantley MD    Indications and Patient Condition  Indications for airway management: airway protection    Preoxygenated: yes  MILS not maintained throughout  Mask difficulty assessment: 0 - not attempted    Final Airway Details  Final airway type: supraglottic airway      Successful airway: bronch  Size 4     Number of attempts at approach: 1  Assessment: lips, teeth, and gum same as pre-op and atraumatic intubation

## 2024-12-03 NOTE — ANESTHESIA POSTPROCEDURE EVALUATION
Patient: Tom Hdez    Procedure Summary       Date: 12/03/24 Room / Location: [facility] OR 04 / [facility] MAIN OR    Anesthesia Start: 0700 Anesthesia Stop: 0859    Procedures:       BILATERAL UPPER EYELID BLEPHAROPLASTY FOREHEAD ROTATIONAL FLAP (Bilateral: Eye)      BILATERAL TEMPORAL DIRECT BROWLIFT (Bilateral: Face) Diagnosis:     Surgeons: Randolph Schwartz MD Provider: Reinier Brantley MD    Anesthesia Type: general ASA Status: 3            Anesthesia Type: general    Vitals  Vitals Value Taken Time   /82 12/03/24 0945   Temp 36.4 °C (97.6 °F) 12/03/24 0930   Pulse 85 12/03/24 0957   Resp 12 12/03/24 0945   SpO2 96 % 12/03/24 0957   Vitals shown include unfiled device data.        Post Anesthesia Care and Evaluation    Patient location during evaluation: PACU  Patient participation: complete - patient participated  Level of consciousness: awake  Pain management: adequate    Airway patency: patent  Anesthetic complications: No anesthetic complications  PONV Status: none  Cardiovascular status: acceptable  Respiratory status: acceptable  Hydration status: acceptable

## 2024-12-03 NOTE — OP NOTE
OPERATIVE NOTE    Patient Identification:  Name: Tom Hdez  Age: 84 y.o.  Sex: female  :  1940  MRN: [id number]                                               Preoperative diagnosis: Bilateral brow ptosis, bilateral upper eyelid dermatochalasis, forehead lesion with hypertrophic scar  Postoperative diagnosis: same  Procedure: 1) Bilateral temporal direct brow lift                     2) Bilateral upper eyelid blepharoplasty                     3) Forehead Rotational flap <10sqcm  Surgeon: Dr. Schwartz who was present and scrubbed throughout all critical portions of the operation  Assistants: none  Anesthesia: General  EBL: less than 50 mL.  Specimens:    Order Name Source Comment Collection Info Order Time   TISSUE PATHOLOGY EXAM Forehead  Collected By: Randolph Schwartz MD 12/3/2024  7:30 AM     Release to patient   Routine Release            Description of the procedure: The patient was taken to the operating room and placed on the table in the supine position, where anesthesia was induced. 2% lidocaine with epinephrine and 0.5% marcaine in a 1:1 fashion was injected over the surgical site, and the patient was prepped and draped in the usual manner for orbitofacial surgery.     Corneal protectors were placed in both eyes.     The corneal protectors were removed and antibiotic ophthalmic ointment was placed over the surgical site.     Description of the procedure:     The patient was taken to the operating room and placed on the table in the supine position, where anesthesia was induced. 2% lidocaine with epinephrine and 0.5% marcaine in a 1:1 fashion was injected over the surgical site, and the patient was prepped and draped in the usual manner for orbitofacial surgery.     Corneal protectors were placed in both eyes.     A 15 Bard-Jordi blade incision was made above the right eyebrow line, across the temporal horizontal extent of the brow. A second incision line was drawn 6 mm above the eyebrow  line, and the intervening tissue was excised with a Bard Daniel blade and Carrie scissors. Sharp dissection was carried down to the frontalis muscle. The eyebrow was undermined inferiorly and was mobilized superiorly. The brow was fixated superiorly to the frontalis muscle with 3-0 vicryl sutures deeply. The incision was then closed with 5-0 monocryl sutures subcutaneously and 5-0 Prolene sutures superficially.     The exact same procedure was performed on the contralateral brow.    We then performed the upper eyelid blepharoplasty  A 15 Bard-Daniel blade incision was made 8 mm from the eyelash margin, across the entire horizontal extent of the right upper eyelid. A second incision was made 12 mm inferior to the junction of the brow and eyelid, and a pinch test was used to ensure the amount of skin excision was appropriate. The intervening tissue was excised with a 15 Bard-Daniel blade and Carrie scissors. Excessive orbicularis tissue was removed. The orbital septum was opened horizontally, and excessive fatty tissue was also removed. Bleeding was controlled with electrocauterization. The skin was then closed with 5-0 fast absorbing suture in an interrupted and running fashion, with care to incorporate the prestarsal orbicularis over the pupil, nasal and temporal limbi respectively. The lid position and contour were examined and found to be satisfactory.     The exact same procedure was preformed over the contralateral upper lid.     Lastly we moved to the forehead rotational flap    There was a large central forehead scar with indentation and erythema with surrounding nodular appearance.  A 15 bard daniel blade was used to perform a rectangular scar excision that was size 2x2cm in size.  There was granulomatous scar tissue with black foreign bodies in the subcutaneous tissue that was removed and sent for pathology in formalin.      We then created a standard H-shaped rotational flap utilizing the deep furrows  of the forehead.  The tissue was undermined extensively to allow mobilization and then closure could be performed under minimal tension.  The flap maintained good perfusion with capillary refill during the entire case.  The rotational flap was sutured to the underlying tissue utilizing 15 buried 5-0 monocryl sutures and the skin was closed with a running-locking 5-0 prolene suture.      The corneal protectors were removed and antibiotic ophthalmic ointment was placed over the surgical site.     The patient was then awakened and taken from the operating room in good condition, having tolerated the procedure well. There were no complications, and the estimated blood loss was less than 50 cc.

## 2024-12-03 NOTE — H&P
History & Physical       Patient: Tom Hdez    Date of Admission: 12/3/2024  6:07 AM    YOB: 1940    Medical Record Number: [id number]      Chief Complaints: Heaviness of both upper eyelids, scar of forehead      History of Present Illness: 84 y.o. female presents with above. No new meds/health problems since office visit      Allergies:   Allergies   Allergen Reactions    Asa [Aspirin] GI Intolerance    Codeine GI Intolerance    Erythromycin GI Intolerance    Glimepiride Dizziness     Weakness   dizzy    Hydromorphone Nausea And Vomiting    Ibuprofen GI Intolerance    Metformin GI Intolerance     diarrhea    Oxycodone Rash     rash    Penicillins Itching    Statins Other (See Comments)     LEG PAINS. Pt reports intolerance. Not allergy       10 point review of systems negative, except pertaining to the HPI    Medications:   Home Medications:  No current facility-administered medications on file prior to encounter.     Current Outpatient Medications on File Prior to Encounter   Medication Sig    acetaminophen (TYLENOL) 500 MG tablet Take 1 tablet by mouth.    albuterol (PROVENTIL HFA;VENTOLIN HFA) 108 (90 BASE) MCG/ACT inhaler Inhale 2 puffs As Needed for Wheezing.    Ascorbic Acid (VITAMIN C) 500 MG capsule Take 500 mg by mouth 2 (two) times a day.    ASPIR 81 MG EC tablet Take 1 tablet by mouth Daily. PT HOLDING FOR SURGERY    calcium citrate-vitamin d (CITRACAL) 200-250 MG-UNIT tablet tablet Take 1 tablet by mouth Daily.    cholecalciferol (VITAMIN D3) 1000 UNITS tablet Take 2 tablets by mouth Daily.    ezetimibe (ZETIA) 10 MG tablet Take 1 tablet by mouth Daily.    JANUVIA 50 MG tablet Take 1 tablet by mouth Daily.    lisinopril (PRINIVIL,ZESTRIL) 10 MG tablet Take 1 tablet by mouth Daily.    lisinopril (PRINIVIL,ZESTRIL) 20 MG tablet     meloxicam (Mobic) 15 MG tablet Take 1 tablet by mouth Daily.    Multiple Vitamins-Minerals (MULTIVITAMIN ADULT PO) Take 1 tablet by mouth Daily.     pravastatin (PRAVACHOL) 10 MG tablet Take 1 tablet by mouth Every Night.    sertraline (ZOLOFT) 50 MG tablet     vitamin E 100 UNIT capsule Take 1 capsule by mouth Daily.     Current Medications:  Scheduled Meds:ceFAZolin 2000 mg IVPB in 100 mL NS (MBP), 2,000 mg, Intravenous, Once  famotidine, 20 mg, Intravenous, Once  sodium chloride, 3 mL, Intravenous, Q12H      Continuous Infusions:lactated ringers, 9 mL/hr, Last Rate: 9 mL/hr (12/03/24 0637)      PRN Meds:.  fentanyl    midazolam    sodium chloride    Past Medical History:   Diagnosis Date    Arthritis     COPD (chronic obstructive pulmonary disease)     Diabetes mellitus     Diverticulitis of colon     History of transfusion     WITH CHILDBIRTH    Hyperlipidemia     Hypertension     WPW (Carol-Parkinson-White syndrome)     CARDIOVERSION. FOLLOWED UP WITH CARDIO ABOUT 10 YRS AGO.        Past Surgical History:   Procedure Laterality Date    APPENDECTOMY      BLEPHAROPLASTY Bilateral     CHOLECYSTECTOMY      COLON RESECTION N/A 03/24/2016    Procedure: small bowel resection and enterorrhaphy;  Surgeon: Gerry Rdz MD;  Location: [facility] MAIN OR;  Service:     COLONOSCOPY  approx 2010    normal per pt     COLONOSCOPY N/A 04/03/2019    Procedure: COLONOSCOPY to cecum and t.i. with hot snare polypectomy;  Surgeon: Thien Cole MD;  Location: [facility] ENDOSCOPY;  Service: Gastroenterology    UPPER GASTROINTESTINAL ENDOSCOPY  pt does not recall approx year     pt does not recall results or issues         Social History     Occupational History    Not on file   Tobacco Use    Smoking status: Former     Current packs/day: 0.50     Average packs/day: 0.5 packs/day for 15.0 years (7.5 ttl pk-yrs)     Types: Cigarettes    Smokeless tobacco: Never    Tobacco comments:     QUIT AT 42 YRS AGO,   Substance and Sexual Activity    Alcohol use: Yes     Alcohol/week: 2.0 standard drinks of alcohol     Types: 2 Glasses of wine per week     Comment: social    Drug use: No     Sexual activity: Defer      Social History     Social History Narrative    Not on file        Family History   Problem Relation Age of Onset    Malig Hyperthermia Neg Hx            Physical Exam   Constitutional: Alert, cooperative, in no acute distress    Head: Normocephalic.   Eyes:   Bilateral upper eyelid dermatochalasis  Bilateral brow ptosis  Central depression at glabella at site of prior incision, surrounding erythema  Neck: Normal range of motion.   Cardiovascular: Normal rate.    Pulmonary/Chest: Effort normal.   Neurological: Alert.   Skin: Skin is warm.   Psychiatric: Normal mood and affect.       Assessment/Plan:  The patient voiced understanding of the risks, benefits, and alternative forms of treatment that were discussed and the patient consents to proceed with bilateral upper eyelid blepharoplasty, forehead rotational flap, bilateral temporal direct brow lift      Nataliia Fong MD

## 2024-12-03 NOTE — ANESTHESIA PREPROCEDURE EVALUATION
Anesthesia Evaluation     NPO Solid Status: > 8 hours  NPO Liquid Status: > 8 hours           Airway   Mallampati: II  No difficulty expected  Dental - normal exam     Pulmonary    (+) COPD,  Cardiovascular   Exercise tolerance: good (4-7 METS)    (+) hypertension, hyperlipidemia      Neuro/Psych  GI/Hepatic/Renal/Endo    (+) diabetes mellitus    Musculoskeletal     Abdominal    Substance History      OB/GYN          Other   arthritis,                 Anesthesia Plan    ASA 3     general     intravenous induction     Anesthetic plan, risks, benefits, and alternatives have been provided, discussed and informed consent has been obtained with: patient.    CODE STATUS:

## 2024-12-05 LAB
CYTO UR: NORMAL
LAB AP CASE REPORT: NORMAL
PATH REPORT.FINAL DX SPEC: NORMAL
PATH REPORT.GROSS SPEC: NORMAL

## (undated) DEVICE — GAUZE,SPONGE,4"X4",16PLY,XRAY,STRL,LF: Brand: MEDLINE

## (undated) DEVICE — SUT MNCRYL 5/0 P3 18 IN Y493G

## (undated) DEVICE — SNAR POLYP SENSATION STDOVL 27 240 BX40

## (undated) DEVICE — STERILE TOOTHPICK SET: Brand: CENTURION

## (undated) DEVICE — THE SINGLE USE ETRAP – POLYP TRAP IS USED FOR SUCTION RETRIEVAL OF ENDOSCOPICALLY REMOVED POLYPS.: Brand: ETRAP

## (undated) DEVICE — PREP SOL POVIDONE/IODINE BT 4OZ

## (undated) DEVICE — SYR LL TP 10ML STRL

## (undated) DEVICE — GLV SURG BIOGEL SENSR LTX PF SZ7.5

## (undated) DEVICE — NDL HYPO PRECISIONGLIDE REG 25G 1 1/2

## (undated) DEVICE — NDL BLNT 18G 1 1/2IN

## (undated) DEVICE — GLV SURG SENSICARE PI MIC PF SZ7 LF STRL

## (undated) DEVICE — BRECKENRIDGE ENT: Brand: MEDLINE INDUSTRIES, INC.

## (undated) DEVICE — Device: Brand: DEFENDO AIR/WATER/SUCTION AND BIOPSY VALVE

## (undated) DEVICE — CANNULA,ADULT,SOFT-TOUCH,7'TUBE,UC: Brand: PENDING

## (undated) DEVICE — GLV SURG SENSICARE PI PF LF 7 GRN STRL

## (undated) DEVICE — THE STERILE LIGHT HANDLE COVER IS USED WITH STERIS SURGICAL LIGHTING AND VISUALIZATION SYSTEMS.

## (undated) DEVICE — INTENDED USE FOR SURGICAL MARKING ON INTACT SKIN, ALSO PROVIDES A PERMANENT METHOD OF IDENTIFYING OBJECTS IN THE OPERATING ROOM: Brand: WRITESITE® REGULAR TIP SKIN MARKER

## (undated) DEVICE — 4-PORT MANIFOLD: Brand: NEPTUNE 2

## (undated) DEVICE — SUT GUT PLN FAST ABS 5/0 PC1 18IN 1915G

## (undated) DEVICE — POSITIONER,HEAD,MULTIRING,36CS: Brand: MEDLINE

## (undated) DEVICE — SUT PROLN 5/0 P3 18IN 8698G

## (undated) DEVICE — TRAP FLD MEGADYNE

## (undated) DEVICE — INTENDED FOR TISSUE SEPARATION, AND OTHER PROCEDURES THAT REQUIRE A SHARP SURGICAL BLADE TO PUNCTURE OR CUT.: Brand: BARD-PARKER ® CARBON RIB-BACK BLADES

## (undated) DEVICE — TOWEL,OR,DSP,ST,BLUE,STD,4/PK,20PK/CS: Brand: MEDLINE

## (undated) DEVICE — GOWN,NON-REINFORCED,SIRUS,SET IN SLV,XL: Brand: MEDLINE

## (undated) DEVICE — THE TORRENT IRRIGATION SCOPE CONNECTOR IS USED WITH THE TORRENT IRRIGATION TUBING TO PROVIDE IRRIGATION FLUIDS SUCH AS STERILE WATER DURING GASTROINTESTINAL ENDOSCOPIC PROCEDURES WHEN USED IN CONJUNCTION WITH AN IRRIGATION PUMP (OR ELECTROSURGICAL UNIT).: Brand: TORRENT

## (undated) DEVICE — BLD SURG BARD/PARKER SZ15C SS STRL

## (undated) DEVICE — TUBING, SUCTION, 1/4" X 10', STRAIGHT: Brand: MEDLINE

## (undated) DEVICE — SHLD PR W/SXN/CUP 22X21MM MD ORNG STRL

## (undated) DEVICE — STERILE COTTON TIP 6IN 10PK: Brand: MEDLINE